# Patient Record
Sex: MALE | Race: OTHER
[De-identification: names, ages, dates, MRNs, and addresses within clinical notes are randomized per-mention and may not be internally consistent; named-entity substitution may affect disease eponyms.]

---

## 2019-01-28 ENCOUNTER — HOSPITAL ENCOUNTER (OUTPATIENT)
Dept: HOSPITAL 8 - ED | Age: 64
Setting detail: OBSERVATION
LOS: 1 days | Discharge: HOME | End: 2019-01-29
Attending: FAMILY MEDICINE | Admitting: FAMILY MEDICINE
Payer: MEDICARE

## 2019-01-28 VITALS — SYSTOLIC BLOOD PRESSURE: 110 MMHG | DIASTOLIC BLOOD PRESSURE: 66 MMHG

## 2019-01-28 VITALS — SYSTOLIC BLOOD PRESSURE: 151 MMHG | DIASTOLIC BLOOD PRESSURE: 74 MMHG

## 2019-01-28 VITALS — BODY MASS INDEX: 27.25 KG/M2 | HEIGHT: 65 IN | WEIGHT: 163.58 LBS

## 2019-01-28 VITALS — DIASTOLIC BLOOD PRESSURE: 69 MMHG | SYSTOLIC BLOOD PRESSURE: 147 MMHG

## 2019-01-28 VITALS — SYSTOLIC BLOOD PRESSURE: 106 MMHG | DIASTOLIC BLOOD PRESSURE: 69 MMHG

## 2019-01-28 VITALS — SYSTOLIC BLOOD PRESSURE: 132 MMHG | DIASTOLIC BLOOD PRESSURE: 75 MMHG

## 2019-01-28 VITALS — SYSTOLIC BLOOD PRESSURE: 128 MMHG | DIASTOLIC BLOOD PRESSURE: 81 MMHG

## 2019-01-28 VITALS — DIASTOLIC BLOOD PRESSURE: 73 MMHG | SYSTOLIC BLOOD PRESSURE: 118 MMHG

## 2019-01-28 VITALS — DIASTOLIC BLOOD PRESSURE: 83 MMHG | SYSTOLIC BLOOD PRESSURE: 135 MMHG

## 2019-01-28 VITALS — SYSTOLIC BLOOD PRESSURE: 142 MMHG | DIASTOLIC BLOOD PRESSURE: 83 MMHG

## 2019-01-28 VITALS — DIASTOLIC BLOOD PRESSURE: 76 MMHG | SYSTOLIC BLOOD PRESSURE: 136 MMHG

## 2019-01-28 DIAGNOSIS — G40.909: Primary | ICD-10-CM

## 2019-01-28 DIAGNOSIS — Z91.19: ICD-10-CM

## 2019-01-28 DIAGNOSIS — Z23: ICD-10-CM

## 2019-01-28 DIAGNOSIS — I10: ICD-10-CM

## 2019-01-28 LAB
ANION GAP SERPL CALC-SCNC: 9 MMOL/L (ref 5–15)
BASOPHILS # BLD AUTO: 0 X10^3/UL (ref 0–0.1)
BASOPHILS NFR BLD AUTO: 0 % (ref 0–1)
CALCIUM SERPL-MCNC: 9.3 MG/DL (ref 8.5–10.1)
CHLORIDE SERPL-SCNC: 107 MMOL/L (ref 98–107)
CREAT SERPL-MCNC: 1.11 MG/DL (ref 0.7–1.3)
EOSINOPHIL # BLD AUTO: 0 X10^3/UL (ref 0–0.4)
EOSINOPHIL NFR BLD AUTO: 0 % (ref 1–7)
ERYTHROCYTE [DISTWIDTH] IN BLOOD BY AUTOMATED COUNT: 13.2 % (ref 9.4–14.8)
LYMPHOCYTES # BLD AUTO: 0.77 X10^3/UL (ref 1–3.4)
LYMPHOCYTES NFR BLD AUTO: 7 % (ref 22–44)
MCH RBC QN AUTO: 29.2 PG (ref 27.5–34.5)
MCHC RBC AUTO-ENTMCNC: 33.8 G/DL (ref 33.2–36.2)
MCV RBC AUTO: 86.5 FL (ref 81–97)
MD: NO
MONOCYTES # BLD AUTO: 0.1 X10^3/UL (ref 0.2–0.8)
MONOCYTES NFR BLD AUTO: 1 % (ref 2–9)
NEUTROPHILS # BLD AUTO: 9.9 X10^3/UL (ref 1.8–6.8)
NEUTROPHILS NFR BLD AUTO: 92 % (ref 42–75)
PLATELET # BLD AUTO: 289 X10^3/UL (ref 130–400)
PMV BLD AUTO: 6.9 FL (ref 7.4–10.4)
RBC # BLD AUTO: 5.36 X10^6/UL (ref 4.38–5.82)

## 2019-01-28 PROCEDURE — 99284 EMERGENCY DEPT VISIT MOD MDM: CPT

## 2019-01-28 PROCEDURE — 80048 BASIC METABOLIC PNL TOTAL CA: CPT

## 2019-01-28 PROCEDURE — 90656 IIV3 VACC NO PRSV 0.5 ML IM: CPT

## 2019-01-28 PROCEDURE — 83735 ASSAY OF MAGNESIUM: CPT

## 2019-01-28 PROCEDURE — 96372 THER/PROPH/DIAG INJ SC/IM: CPT

## 2019-01-28 PROCEDURE — 96365 THER/PROPH/DIAG IV INF INIT: CPT

## 2019-01-28 PROCEDURE — 99285 EMERGENCY DEPT VISIT HI MDM: CPT

## 2019-01-28 PROCEDURE — 85025 COMPLETE CBC W/AUTO DIFF WBC: CPT

## 2019-01-28 PROCEDURE — 36415 COLL VENOUS BLD VENIPUNCTURE: CPT

## 2019-01-28 PROCEDURE — G0378 HOSPITAL OBSERVATION PER HR: HCPCS

## 2019-01-28 PROCEDURE — G0008 ADMIN INFLUENZA VIRUS VAC: HCPCS

## 2019-01-28 RX ADMIN — FAMOTIDINE SCH MG: 20 TABLET, FILM COATED ORAL at 20:31

## 2019-01-28 RX ADMIN — LEVETIRACETAM SCH MG: 500 TABLET ORAL at 20:31

## 2019-01-28 NOTE — NUR
LATE NOTE ENTRY FOR 1146:



Pt brought in by EMS with c/o seizure activity unwittnessed at home by pt's 
brother. Pt's brother went to check in on him in his room at 10:30 am and pt 
was not awake for the day. Pt's brother states pt was "not acting himself." 
Paramedics state pt is alert to self and place. PT restless stating nausea and 
requires assistance in changing out of clothes. Pt connected to all monitors 
and seizure precautions in place. Pt had 18 g PIV in right forearm in place 
prior to arrival. EMS states pt had empty bottle of keppra and lisinopril, and 
pt's brother states pt has been out of home meds for at least one week. Pt's 
brother states pt has an appt with neurology on 2/4/19. Pt recieved 4 mg IV 
zofran by EMS. Pt's FSBG at 150 by EMS.



Pt AOX2. Pt denies pain. Pt has unlabored respirations equal bilaterall. NADN. 
Call light within reach. [facility] slip sent for request of PIV medication 
per EMAR.

## 2019-01-28 NOTE — NUR
PIV medication finished infusing per EMAR. PT remains in postictyl phase with 
restless movements of arms itching is face and nose and turning head back and 
forth. PT is sitting upright in bed with and seizure precautions remain in 
place. Pt has unlabored respirations equal bilaterally and spo2 at  93% on room 
air. Pt continues to remove face mask or nasal cannula. ED RN at bedside 
attempting to keep nasal cannula or non-rebreather mask on pt. Pt remains 
connected to all monitors. Pt had incontinet episode of bladder during 
wittnessed seizure.

## 2019-01-28 NOTE — NUR
Pt remains in postictyl states. PT has unlabored respirations equal bilaterally 
and spo2% is between 90-93% on room air. Pt remains connected to all monitors 
and seizure precautions remain in place. Pt continues to have restless 
movements of arms and is itching nose and face.

## 2019-01-28 NOTE — NUR
[place] seizure at bedside by ED RN and ED staff right at time of intiating 
connection of IV tubing for PIV medications per [facility]. Pt seized for 
approximately 1 minute. Pt placed on non-rebreather mask and repositioned in 
bed. Pt on non-rebreather mask at 10L and spo2 at 98%. Pt spo2% during seizure 
dropped to 80% on room air. Suciton set up at bedside as well as ambu bag set 
up and ready to go as need. Seizure precautions remained in place.

## 2019-01-28 NOTE — NUR
Pt in postictyl state at this time. Pt's eyes are closed and pt has restless 
movements with arms attempting to scratch face and turning his head side to 
side. Pt remains =on non-rebreather face mask at 10L and is connected to 
cardiac monitor, spo2% monitor, and NIBP. PIV medication infusing per EMAR. PT 
has unlaobred respirations equal bilaterally.

## 2019-01-28 NOTE — NUR
Placed spo2% monitor on pt's left second toe. spo2% reading at 96% on room air. 
Pt remains in postictyl state with restless movements of arms scratching his 
nose and moving his head back and forth. Pt non-responsive to questions at this 
time.

## 2019-01-29 VITALS — SYSTOLIC BLOOD PRESSURE: 151 MMHG | DIASTOLIC BLOOD PRESSURE: 74 MMHG

## 2019-01-29 VITALS — DIASTOLIC BLOOD PRESSURE: 81 MMHG | SYSTOLIC BLOOD PRESSURE: 129 MMHG

## 2019-01-29 VITALS — SYSTOLIC BLOOD PRESSURE: 145 MMHG | DIASTOLIC BLOOD PRESSURE: 84 MMHG

## 2019-01-29 VITALS — SYSTOLIC BLOOD PRESSURE: 136 MMHG | DIASTOLIC BLOOD PRESSURE: 78 MMHG

## 2019-01-29 RX ADMIN — LEVETIRACETAM SCH MG: 500 TABLET ORAL at 11:30

## 2019-01-29 RX ADMIN — FAMOTIDINE SCH MG: 20 TABLET, FILM COATED ORAL at 11:30

## 2021-03-03 DIAGNOSIS — Z23 NEED FOR VACCINATION: ICD-10-CM
